# Patient Record
Sex: FEMALE | Race: WHITE
[De-identification: names, ages, dates, MRNs, and addresses within clinical notes are randomized per-mention and may not be internally consistent; named-entity substitution may affect disease eponyms.]

---

## 2018-01-24 ENCOUNTER — HOSPITAL ENCOUNTER (EMERGENCY)
Dept: HOSPITAL 68 - ERH | Age: 68
Discharge: ANOTHER HOSPITAL | End: 2018-01-24
Payer: COMMERCIAL

## 2018-01-24 VITALS — BODY MASS INDEX: 24.84 KG/M2 | HEIGHT: 62 IN | WEIGHT: 135 LBS

## 2018-01-24 DIAGNOSIS — S01.511A: ICD-10-CM

## 2018-01-24 DIAGNOSIS — S02.642A: Primary | ICD-10-CM
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
## 2018-01-24 NOTE — RADIOLOGY REPORT
EXAMINATION:
XR SHOULDER, RIGHT
 
CLINICAL INFORMATION:
Status post fall evaluate for fracture.
 
COMPARISON:
There are no prior studies for comparison.
 
TECHNIQUE:
AP external rotation, Grashey, scapular Y, and axillary views of the right
shoulder.
 
FINDINGS:
Bulky osteophyte formation is present in the right acromioclavicular joint.
The bones and soft tissues are otherwise normal. No fracture. Glenohumeral
and acromioclavicular alignment is anatomic with normal joint space. No
abnormal soft tissue calcifications.
 
IMPRESSION:
No radiographic evidence of acute fracture or subluxation of the right
shoulder noted at this time.

## 2018-01-24 NOTE — CT SCAN REPORT
EXAMINATION:
CT HEAD , FACIAL BONES AND CERVICAL SPINE WITHOUT CONTRAST
 
CLINICAL INFORMATION:
Status post fall, struck head and jaw presenting with jaw pain.
 
COMPARISON:
There are no prior studies for comparison.
 
TECHNIQUE:
Contiguous axial imaging was performed from the skull base to vertex without
intravenous administration of contrast.
 
DLP:
1771 mGy-cm
 
HEAD FINDINGS:
 
There is no evidence of acute intracranial hemorrhage or territorial
infarction. No abnormal mass effect or midline shift is seen. Gray to white
matter differentiation is well preserved. No extra-axial fluid collections
are identified.
 
The ventricles are normal in size. There is no abnormal attenuation within
the brain parenchyma. The osseous structures and soft tissues are normal. The
mastoid air cells and visualized portions of the paranasal sinuses are well
aerated.
 
FACIAL BONES FINDINGS:
There are cortical defects in both the anterior posterior and lateral
cortical margins of the left mandibular ramus which is proximal to the level
of the left temporomandibular joint, consistent with a nondisplaced
transverse fracture. The alignment of the left temporomandibular joint
remains intact and symmetric compared to the alignment of the right TMJ. Of
note the fracture is well visualized only on the coronal and sagittal
reformats and very difficult to identify on the axial images.
No additional facial bone fractures are noted.
Mild mucoperiosteal thickening and a mucous retention cyst is present in the
left maxillary sinus. No air-fluid levels are present within the paranasal
sinuses.
 
CERVICAL SPINE FINDINGS:
 
The frontal and sagittal alignment is within normal limits. Incidental note
is made of focal degenerative changes present at the C6-C7 level with loss of
intervertebral disc height and small anterior osteophytes.
 
No cortical defects are noted throughout the cervical spine to suggest acute
fracture.
No finding focal findings are noted within the bilateral pulmonary apices or
the thyroid lobes. There is partial visualization of a left sided central
line.
 
IMPRESSION:
 
1. No acute intracranial pathology.
2. Transverse nondisplaced fracture of the left mandibular ramus.
3. Focal degenerative changes at the C6-C7 level, no acute findings.
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
